# Patient Record
Sex: MALE | Race: WHITE | Employment: FULL TIME | URBAN - METROPOLITAN AREA
[De-identification: names, ages, dates, MRNs, and addresses within clinical notes are randomized per-mention and may not be internally consistent; named-entity substitution may affect disease eponyms.]

---

## 2024-05-01 ENCOUNTER — APPOINTMENT (OUTPATIENT)
Dept: RADIOLOGY | Facility: HOSPITAL | Age: 64
End: 2024-05-01
Payer: COMMERCIAL

## 2024-05-01 ENCOUNTER — HOSPITAL ENCOUNTER (EMERGENCY)
Facility: HOSPITAL | Age: 64
Discharge: HOME | End: 2024-05-01
Attending: EMERGENCY MEDICINE
Payer: COMMERCIAL

## 2024-05-01 ENCOUNTER — APPOINTMENT (OUTPATIENT)
Dept: CARDIOLOGY | Facility: HOSPITAL | Age: 64
End: 2024-05-01
Payer: COMMERCIAL

## 2024-05-01 VITALS
SYSTOLIC BLOOD PRESSURE: 150 MMHG | BODY MASS INDEX: 28.92 KG/M2 | OXYGEN SATURATION: 100 % | TEMPERATURE: 97 F | RESPIRATION RATE: 18 BRPM | WEIGHT: 202 LBS | HEART RATE: 68 BPM | DIASTOLIC BLOOD PRESSURE: 90 MMHG | HEIGHT: 70 IN

## 2024-05-01 DIAGNOSIS — R42 LIGHTHEADEDNESS: Primary | ICD-10-CM

## 2024-05-01 DIAGNOSIS — R07.2 PRECORDIAL PAIN: ICD-10-CM

## 2024-05-01 LAB
ALBUMIN SERPL-MCNC: 4.1 G/DL (ref 3.5–5)
ALP BLD-CCNC: 93 U/L (ref 35–125)
ALT SERPL-CCNC: 22 U/L (ref 5–40)
ANION GAP SERPL CALC-SCNC: 8 MMOL/L
AST SERPL-CCNC: 39 U/L (ref 5–40)
BASOPHILS # BLD AUTO: 0 X10*3/UL (ref 0–0.1)
BASOPHILS NFR BLD AUTO: 0 %
BILIRUB SERPL-MCNC: 0.3 MG/DL (ref 0.1–1.2)
BUN SERPL-MCNC: 15 MG/DL (ref 8–25)
CALCIUM SERPL-MCNC: 8.9 MG/DL (ref 8.5–10.4)
CHLORIDE SERPL-SCNC: 102 MMOL/L (ref 97–107)
CO2 SERPL-SCNC: 30 MMOL/L (ref 24–31)
CREAT SERPL-MCNC: 1 MG/DL (ref 0.4–1.6)
D DIMER PPP FEU-MCNC: 0.34 MG/L FEU (ref 0.19–0.5)
EGFRCR SERPLBLD CKD-EPI 2021: 84 ML/MIN/1.73M*2
EOSINOPHIL # BLD AUTO: 0.02 X10*3/UL (ref 0–0.7)
EOSINOPHIL NFR BLD AUTO: 1 %
ERYTHROCYTE [DISTWIDTH] IN BLOOD BY AUTOMATED COUNT: 12.9 % (ref 11.5–14.5)
GLUCOSE SERPL-MCNC: 99 MG/DL (ref 65–99)
HCT VFR BLD AUTO: 44.9 % (ref 41–52)
HGB BLD-MCNC: 15 G/DL (ref 13.5–17.5)
IMM GRANULOCYTES # BLD AUTO: 0 X10*3/UL (ref 0–0.7)
IMM GRANULOCYTES NFR BLD AUTO: 0 % (ref 0–0.9)
LYMPHOCYTES # BLD AUTO: 0.72 X10*3/UL (ref 1.2–4.8)
LYMPHOCYTES NFR BLD AUTO: 35.8 %
MAGNESIUM SERPL-MCNC: 2 MG/DL (ref 1.6–3.1)
MCH RBC QN AUTO: 30.7 PG (ref 26–34)
MCHC RBC AUTO-ENTMCNC: 33.4 G/DL (ref 32–36)
MCV RBC AUTO: 92 FL (ref 80–100)
MONOCYTES # BLD AUTO: 0.28 X10*3/UL (ref 0.1–1)
MONOCYTES NFR BLD AUTO: 13.9 %
NEUTROPHILS # BLD AUTO: 0.99 X10*3/UL (ref 1.2–7.7)
NEUTROPHILS NFR BLD AUTO: 49.3 %
NRBC BLD-RTO: 0 /100 WBCS (ref 0–0)
PLATELET # BLD AUTO: 113 X10*3/UL (ref 150–450)
POLYCHROMASIA BLD QL SMEAR: NORMAL
POTASSIUM SERPL-SCNC: 4.9 MMOL/L (ref 3.4–5.1)
PROT SERPL-MCNC: 6.9 G/DL (ref 5.9–7.9)
RBC # BLD AUTO: 4.89 X10*6/UL (ref 4.5–5.9)
RBC MORPH BLD: NORMAL
SODIUM SERPL-SCNC: 140 MMOL/L (ref 133–145)
TROPONIN T SERPL-MCNC: 10 NG/L
TROPONIN T SERPL-MCNC: 8 NG/L
WBC # BLD AUTO: 2 X10*3/UL (ref 4.4–11.3)

## 2024-05-01 PROCEDURE — 71046 X-RAY EXAM CHEST 2 VIEWS: CPT

## 2024-05-01 PROCEDURE — 80053 COMPREHEN METABOLIC PANEL: CPT | Performed by: EMERGENCY MEDICINE

## 2024-05-01 PROCEDURE — 36415 COLL VENOUS BLD VENIPUNCTURE: CPT | Performed by: EMERGENCY MEDICINE

## 2024-05-01 PROCEDURE — 85025 COMPLETE CBC W/AUTO DIFF WBC: CPT | Performed by: EMERGENCY MEDICINE

## 2024-05-01 PROCEDURE — 85300 ANTITHROMBIN III ACTIVITY: CPT | Performed by: EMERGENCY MEDICINE

## 2024-05-01 PROCEDURE — 99283 EMERGENCY DEPT VISIT LOW MDM: CPT | Mod: 25

## 2024-05-01 PROCEDURE — 99284 EMERGENCY DEPT VISIT MOD MDM: CPT | Mod: 25

## 2024-05-01 PROCEDURE — 84484 ASSAY OF TROPONIN QUANT: CPT | Performed by: EMERGENCY MEDICINE

## 2024-05-01 PROCEDURE — 96360 HYDRATION IV INFUSION INIT: CPT

## 2024-05-01 PROCEDURE — 83735 ASSAY OF MAGNESIUM: CPT | Performed by: EMERGENCY MEDICINE

## 2024-05-01 PROCEDURE — 2500000004 HC RX 250 GENERAL PHARMACY W/ HCPCS (ALT 636 FOR OP/ED): Performed by: EMERGENCY MEDICINE

## 2024-05-01 PROCEDURE — 93005 ELECTROCARDIOGRAM TRACING: CPT

## 2024-05-01 PROCEDURE — 71046 X-RAY EXAM CHEST 2 VIEWS: CPT | Performed by: STUDENT IN AN ORGANIZED HEALTH CARE EDUCATION/TRAINING PROGRAM

## 2024-05-01 RX ADMIN — SODIUM CHLORIDE 1000 ML: 900 INJECTION, SOLUTION INTRAVENOUS at 09:24

## 2024-05-01 ASSESSMENT — HEART SCORE
RISK FACTORS: 1-2 RISK FACTORS
AGE: 65+
HISTORY: SLIGHTLY SUSPICIOUS
TROPONIN: LESS THAN OR EQUAL TO NORMAL LIMIT
ECG: NORMAL
HEART SCORE: 3

## 2024-05-01 ASSESSMENT — PAIN SCALES - GENERAL: PAINLEVEL_OUTOF10: 0 - NO PAIN

## 2024-05-01 ASSESSMENT — PAIN - FUNCTIONAL ASSESSMENT: PAIN_FUNCTIONAL_ASSESSMENT: 0-10

## 2024-05-01 NOTE — ED PROVIDER NOTES
HPI   Chief Complaint   Patient presents with    Chest Pain    Dizziness       HPI  Patient is a 64-year-old otherwise healthy male who presents to ED for episode of chest pain and lightheadedness that occurred this morning.  Patient states he was walking to his car when episode occurred, chest pain was left-sided, did not radiate, self resolved.  Patient states he is still lightheaded, reports weakness in his legs.  Patient states he ran a half marathon over the weekend in [place], flew home after marathon.  Patient states he was experiencing flulike symptoms at the time of the half marathon, reports fever of 100.4.                  No data recorded                   Patient History   No past medical history on file.  No past surgical history on file.  No family history on file.  Social History     Tobacco Use    Smoking status: Not on file    Smokeless tobacco: Not on file   Substance Use Topics    Alcohol use: Not on file    Drug use: Not on file       Physical Exam   ED Triage Vitals [05/01/24 0815]   Temperature Heart Rate Respirations BP   36.1 °C (97 °F) 68 18 150/90      Pulse Ox Temp Source Heart Rate Source Patient Position   100 % Temporal -- --      BP Location FiO2 (%)     -- --       Physical Exam  Vitals reviewed.   Constitutional:       Appearance: Normal appearance. He is well-developed.   HENT:      Head: Normocephalic and atraumatic.   Eyes:      Extraocular Movements: Extraocular movements intact.   Cardiovascular:      Rate and Rhythm: Normal rate and regular rhythm.   Pulmonary:      Effort: Pulmonary effort is normal.      Breath sounds: Normal breath sounds.   Abdominal:      General: Abdomen is flat.   Musculoskeletal:         General: Normal range of motion.      Cervical back: Normal range of motion and neck supple.   Skin:     General: Skin is warm and dry.   Neurological:      General: No focal deficit present.      Mental Status: He is alert and oriented to person, place, and time.    Psychiatric:         Mood and Affect: Mood normal.         Behavior: Behavior normal.         ED Course & MDM   ED Course as of 05/01/24 1347   Wed May 01, 2024   0816 EKG on my independent interpretation: Normal sinus rhythm 69 bpm, normal axis, normal intervals, no acute ST or T wave abnormalities [GALLITO]      ED Course User Index  [GALLITO] Sunshine Marinelli MD         Diagnoses as of 05/01/24 1347   Lightheadedness   Precordial pain       Medical Decision Making  Parts of this chart have been completed using voice recognition software. Please excuse any errors of transcription.  My thought process and reason for plan has been formulated from the time that I saw the patient until the time of disposition and is not specific to one specific moment during their visit and furthermore my MDM encompasses this entire chart and not only this text box.    HPI:   Detailed above.    Exam:   A medically appropriate exam performed, outlined above, given the known history and presentation.    History obtained from:   Patient, EMR    EKG/Cardiac monitor:   Interpreted by attending physician, see their note for ED course for more detail.    Social Determinants of Health considered during this visit:   Housing, Family or social support    Medications given during visit:  Medications   sodium chloride 0.9 % bolus 1,000 mL (0 mL intravenous Stopped 5/1/24 1024)        Diagnostic/tests:  Labs Reviewed   CBC WITH AUTO DIFFERENTIAL - Abnormal       Result Value    WBC 2.0 (*)     nRBC 0.0      RBC 4.89      Hemoglobin 15.0      Hematocrit 44.9      MCV 92      MCH 30.7      MCHC 33.4      RDW 12.9      Platelets 113 (*)     Neutrophils % 49.3      Immature Granulocytes %, Automated 0.0      Lymphocytes % 35.8      Monocytes % 13.9      Eosinophils % 1.0      Basophils % 0.0      Neutrophils Absolute 0.99 (*)     Immature Granulocytes Absolute, Automated 0.00      Lymphocytes Absolute 0.72 (*)     Monocytes Absolute 0.28      Eosinophils  Absolute 0.02      Basophils Absolute 0.00     COMPREHENSIVE METABOLIC PANEL - Normal    Glucose 99      Sodium 140      Potassium 4.9      Chloride 102      Bicarbonate 30      Urea Nitrogen 15      Creatinine 1.00      eGFR 84      Calcium 8.9      Albumin 4.1      Alkaline Phosphatase 93      Total Protein 6.9      AST 39      Bilirubin, Total 0.3      ALT 22      Anion Gap 8     MAGNESIUM - Normal    Magnesium 2.00     SERIAL TROPONIN, INITIAL ([facility]) - Normal    Troponin T, High Sensitivity 10     D-DIMER, NON VTE - Normal    D-Dimer Non VTE, Quant (mg/L FEU) 0.34      Narrative:     THROMBOEMBOLIC EVENTS CANNOT BE EXCLUDED SOLELY ON THE BASIS OF THE D-DIMER LEVEL BEING WITHIN THE NORMAL REFERENCE RANGE. D-DIMER LEVELS LESS THAN 0.5 MG/L FEU IN CONJUNCTION WITH A LOW CLINICAL PROBABILITY HAVE AN EXCELLENT NEGATIVE PREDICTIVE VALUE IN EXCLUDING A DIAGNOSIS OF PULMONARY EMBOLUS (PE) OR DEEP VEIN THROMBOSIS (DVT). ELEVATED D-DIMER LEVELS ARE NOT SPECIFIC TO PE OR DVT, AND MAY BE SEEN IN PATIENTS WITH DIC, ADVANCED AGE, PREGNANCY, MALIGNANCY, LIVER DISEASE, INFECTION, AND INFLAMMATORY CONDITIONS AMONG OTHERS. D-DIMER LEVELS MAY BE DECREASED IN PATIENTS RECEIVING ANTI-COAGULATION THERAPY.   SERIAL TROPONIN,  2 HOUR ([facility]) - Normal    Troponin T, High Sensitivity 8     TROPONIN T SERIES, HIGH SENSITIVITY (0, 2 HR, 6 HR)    Narrative:     The following orders were created for panel order Troponin T Series, High Sensitivity (0, 2HR, 6HR).  Procedure                               Abnormality         Status                     ---------                               -----------         ------                     Serial Troponin, Initial...[[id number]]  Normal              Final result               Serial Troponin, 2 Hour ...[[id number]]  Normal              Final result               Serial Troponin, 6 Hour ...[[id number]]                                                   Please view results for these tests on the  individual orders.   SERIAL TROPONIN, 6 HOUR ([facility])   MORPHOLOGY    RBC Morphology No significant RBC morphology present      Polychromasia Mild        XR chest 2 views   Final Result   1.  No evidence of acute cardiopulmonary process. Scattered few tiny   atelectatic bands.                  MACRO:   None        Signed by: Gee Cade 5/1/2024 9:07 AM   Dictation workstation:   [id number]           Differential Diagnosis:   As I have deemed necessary from their history, physical, and studies, I have considered the following diagnoses:     ACUTE MYOCARDIAL INFARCTION  PULMONARY EMBOLISM  AORTIC DISSECTION  PERICARDITIS  PNEUMOTHORAX  PNEUMONIA    HEART Score <=3 and 2 negative troponins - No hospitalization indicated  I completed a HEART Score to screen for Major Adverse Cardiac Event (MACE) in this patient. The evidence indicates that the patient is very low risk for MACE and this is consistent with my clinical intuition. The risk of further workup or hospitalization for MACE is likely higher than the risk of the patient having a MACE. It is, therefore, in the patient´s best interest not to do additional emergent testing or to be hospitalized for MACE. I have discussed with the patient my clinical impression and the result of the HEART Score to screen for MACE, as well as the risks of further testing and hospitalization. The HEART Score shows that the risk for MACE is less than 1%    -Viral syndrome is more likely for the following reason(s): Based on history and physical  -Orthostatic hypotension is more likely for the following reason(s): Positional dizziness     -Acute myocardial infarction is less likely for the following reason(s): Negative troponins, not having chest pain  -Pulmonary embolism is less likely for the following reason(s): Normal D-dimer, no chest pain    Consultations:      Procedures:      Critical Care:      Referrals:      Discharge Prescriptions:      MDM Summary:  CBC significant for  leukopenia, no anemia.  CMP is without electrolyte abnormality or [facility]UR[facility].  Troponins are within normal limits and flat.  Chest x-ray shows no acute findings.  Low suspicion for ACS or PE.  Patient can follow-up with primary care provider or cardiology.  Patient understands and agrees with plan.    We have discussed the diagnosis and risks, and we agree with discharging home to follow-up with appropriate physician as directed. We also discussed returning to the Emergency Department immediately if new or worsening symptoms occur. We have discussed the symptoms which are most concerning that necessitate immediate return. Pt symptoms have been well controlled here and the patient is safe for discharge with appropriate outpatient follow up. The patient has verbalized understanding to return to ER without delay for new or worsening pains or for any other symptoms or concerns. I utilized the discharge clinical management tool provided [employer] to help estimate risk of negative outcome for this patient.      Disposition:  The patient was discharged    Admission/observation considered: Yes  Reason for deciding against admission/observation: Does not meet inpatient criteria      Procedure  Procedures     Nitish Norris PA-C  05/01/24 1337

## 2024-05-01 NOTE — ED TRIAGE NOTES
Patient with chest tightness and dizziness. Ran a half marathon on Saturday while having flu-like symptoms. Temperature on Sunday, but flu-like symptoms mostly improved. Currently feeling lightheaded. No cardiac or respiratory history.

## 2024-05-03 LAB
ATRIAL RATE: 69 BPM
P AXIS: 48 DEGREES
P OFFSET: 191 MS
P ONSET: 131 MS
PR INTERVAL: 186 MS
Q ONSET: 224 MS
QRS COUNT: 11 BEATS
QRS DURATION: 88 MS
QT INTERVAL: 394 MS
QTC CALCULATION(BAZETT): 422 MS
QTC FREDERICIA: 413 MS
R AXIS: 36 DEGREES
T AXIS: 43 DEGREES
T OFFSET: 421 MS
VENTRICULAR RATE: 69 BPM